# Patient Record
(demographics unavailable — no encounter records)

---

## 2025-01-02 NOTE — PHYSICAL EXAM
[Respiratory Effort] : normal respiratory effort [Normal Rate and Rhythm] : normal rate and rhythm [No Rash or Lesion] : No rash or lesion [Calm] : calm [de-identified] : NAD, well-appearing  [de-identified] : large right thyroid nodule, on bedside US, nodule is solid, isoechoic and taking up most of the right lobe. She is speaking in a normal voice.  [de-identified] : soft, nondistended

## 2025-01-02 NOTE — ADDENDUM
[FreeTextEntry1] : Results discussed with patient Recommend repeat US 1 year    PATIENT: EDWARD HOYOS               [facility]: [id number] [id number]                        AGE/SX: 71/F         ROOM: [facility] DR: Sharmin Wilson MD                :    1953   BED: RE24                          DIS: STATUS: REG REF ----------------------------------------------------------------------------  SPEC #:             RECD:      STATUS:  JAMAR REQ #: [id number]            MANISH: 24- ENTERED:      SP TYPE: CYTOLOGY PCP: Ke Hermosillo MD [facility] DR: Sharmin Wilson MD OTHR DR:    Specimen(s)  Right thyroid nodule, 5.0 cm, solid  Clinical History  Multiple thyroid nodules (E04.2) FINAL CYTOLOGIC DIAGNOSIS  Right thyroid nodule, ultrasound guided fine needle aspiration: BENIGN ([place] Category II) - Consistent with follicular nodular disease   Diagnosis Comment  The Diff-Quik and Papanicolaou stained direct smears and ThinPrep slide show benign follicular cells with small uniform nuclei and moderate cytoplasm dispersed in sheets, groups and macrofollicles in a hemorrhagic background of colloid. TRINI Stevens([facility])  ICD Codes  E04.2  Gross Description  Right thyroid nodule: 3 air-dried smears received and prepared for Diff-Quik, 3 direct smears received in 95% alcohol and prepared for Pap stain, 30 ml hazy pink fluid received in CytoLyt fixative for 1 ThinPrep slide prepared for Pap stain. ThyroSeq vial received and reserved for molecular studies.  [place]ons[place] 24 - [address]  Final:  Aliza Walton MD                  *  24       <> by GAYE

## 2025-01-02 NOTE — PHYSICAL EXAM
[Respiratory Effort] : normal respiratory effort [Normal Rate and Rhythm] : normal rate and rhythm [No Rash or Lesion] : No rash or lesion [Calm] : calm [de-identified] : NAD, well-appearing  [de-identified] : large right thyroid nodule, on bedside US, nodule is solid, isoechoic and taking up most of the right lobe. She is speaking in a normal voice.  [de-identified] : soft, nondistended

## 2025-01-02 NOTE — ADDENDUM
[FreeTextEntry1] : Results discussed with patient Recommend repeat US 1 year    PATIENT: EDWARD HOYOS               [id number] [id number]                        AGE/SX: 71/F         ROOM: [facility] DR: Sharmin Wilson MD                :    1953   BED: RE24                          DIS: STATUS: REG REF ----------------------------------------------------------------------------  SPEC #:             RECD:      STATUS:  JAMAR REQ #: [id number]            MANISH: 24- ENTERED:      SP TYPE: CYTOLOGY PCP: Ke Hermosillo MD [facility] DR: Sharmin Wilson MD OTHR DR:    Specimen(s)  Right thyroid nodule, 5.0 cm, solid  Clinical History  Multiple thyroid nodules (E04.2) FINAL CYTOLOGIC DIAGNOSIS  Right thyroid nodule, ultrasound guided fine needle aspiration: BENIGN ([place] Category II) - Consistent with follicular nodular disease   Diagnosis Comment  The Diff-Quik and Papanicolaou stained direct smears and ThinPrep slide show benign follicular cells with small uniform nuclei and moderate cytoplasm dispersed in sheets, groups and macrofollicles in a hemorrhagic background of colloid. TRINI Stevens([facility])  ICD Codes  E04.2  Gross Description  Right thyroid nodule: 3 air-dried smears received and prepared for Diff-Quik, 3 direct smears received in 95% alcohol and prepared for Pap stain, 30 ml hazy pink fluid received in CytoLyt fixative for 1 ThinPrep slide prepared for Pap stain. ThyroSeq vial received and reserved for molecular studies.  Little Gonsalez 24 - 913 SMITH  Final:  Aliza Walton MD                  *  24       <> by GAYE

## 2025-01-02 NOTE — HISTORY OF PRESENT ILLNESS
[de-identified] : 70 yo F with no significant PMH with known thyroid nodules that have been watched for many years and biopsied in the past (many years ago) presents for consultation regarding same. Her right thyroid nodule has been growing from ~4 cm in 2018 to 5.0x2.2x3.5cm most recently (TIRADS 3). She has additional TIRADS 3 nodules bilaterally all about 1 cm. She has no symptoms of mass effect. Her sister had a thyroidectomy as well for what ended up being benign nodule (Hurthle cell adenoma).

## 2025-01-02 NOTE — ASSESSMENT
[FreeTextEntry1] : 70 yo F with enlarging right dominant thyroid nodule, last biopsied many years ago.  We had a long discussion about thyroid anatomy, physiology and pathophysiology. We discussed the role of fine needle aspiration biopsies, their interpretation and management. We discussed molecular testing of indeterminate nodules. We also discussed potential need for hemithyroidectomy if nodule was indeterminate as well as for symptoms if she develops them in the case of benign nodules. We discussed thyroid replacement. We discussed RFA ablation for benign nodules if she wanted to avoid surgery but address the nodule.  Will start with repeat FNA today given size and duration from prior in the setting of nodule growth. Further management to be determined based on results.  Patient is in agreement to proceed with FNA.  After informed consent was signed by the patient and witness by the medical assistant, an ultrasound was used to identify the nodule of interest. The skin was prepped with alcohol. Using a 25G needle under US guidance, a fine needle aspiration was performed of the5cm right thyroid nodule. The nodule was biopsied with three different passes, placing the specimen from each pass on a labeled slide as well as rinsed in Cytolyt solution and small portion placed in Thyroseq vial. Hemostasis was confirmed. A small band-aid was placed on the neck. The patient tolerated the procedure well.  I will call patient with results of FNA.

## 2025-01-02 NOTE — CONSULT LETTER
[Dear  ___] : Dear  [unfilled], [( Thank you for referring [unfilled] for consultation for _____ )] : Thank you for referring [unfilled] for consultation for [unfilled] [Please see my note below.] : Please see my note below. [Consult Closing:] : Thank you very much for allowing me to participate in the care of this patient.  If you have any questions, please do not hesitate to contact me. [Sincerely,] : Sincerely, [FreeTextEntry3] : Sharmin Wilson MD

## 2025-01-02 NOTE — HISTORY OF PRESENT ILLNESS
[de-identified] : 70 yo F with no significant PMH with known thyroid nodules that have been watched for many years and biopsied in the past (many years ago) presents for consultation regarding same. Her right thyroid nodule has been growing from ~4 cm in 2018 to 5.0x2.2x3.5cm most recently (TIRADS 3). She has additional TIRADS 3 nodules bilaterally all about 1 cm. She has no symptoms of mass effect. Her sister had a thyroidectomy as well for what ended up being benign nodule (Hurthle cell adenoma).